# Patient Record
Sex: MALE | Race: WHITE | NOT HISPANIC OR LATINO | URBAN - METROPOLITAN AREA
[De-identification: names, ages, dates, MRNs, and addresses within clinical notes are randomized per-mention and may not be internally consistent; named-entity substitution may affect disease eponyms.]

---

## 2024-04-02 ENCOUNTER — OFFICE VISIT (OUTPATIENT)
Dept: URGENT CARE | Facility: CLINIC | Age: 44
End: 2024-04-02
Payer: COMMERCIAL

## 2024-04-02 VITALS
OXYGEN SATURATION: 98 % | SYSTOLIC BLOOD PRESSURE: 144 MMHG | HEART RATE: 112 BPM | BODY MASS INDEX: 39.39 KG/M2 | RESPIRATION RATE: 18 BRPM | TEMPERATURE: 98 F | DIASTOLIC BLOOD PRESSURE: 90 MMHG | HEIGHT: 71 IN | WEIGHT: 281.38 LBS

## 2024-04-02 DIAGNOSIS — R10.30 LOWER ABDOMINAL PAIN: ICD-10-CM

## 2024-04-02 DIAGNOSIS — R10.9 ABDOMINAL CRAMPING: Primary | ICD-10-CM

## 2024-04-02 DIAGNOSIS — K59.00 CONSTIPATION, UNSPECIFIED CONSTIPATION TYPE: ICD-10-CM

## 2024-04-02 PROCEDURE — 99203 OFFICE O/P NEW LOW 30 MIN: CPT | Mod: S$GLB,,, | Performed by: STUDENT IN AN ORGANIZED HEALTH CARE EDUCATION/TRAINING PROGRAM

## 2024-04-02 NOTE — LETTER
April 2, 2024      [facility] [place]  [address]  [address]  [place] [address]  Phone: [phone]  Fax: [phone]       Patient: Reynaldo Jessica   YOB: 1980  Date of Visit: 04/02/2024    To Whom It May Concern:    Lynn Jessica  was at [facility] on 04/02/2024. The patient may return to work/school on 04/03/2024 with no restrictions. If you have any questions or concerns, or if I can be of further assistance, please do not hesitate to contact me.    Sincerely,    Papito Hrone NP

## 2024-04-02 NOTE — PROGRESS NOTES
"Subjective:      Patient ID: Reynaldo Jessica is a 44 y.o. male.    Vitals:  height is 5' 11" (1.803 m) and weight is 127.6 kg (281 lb 6.4 oz). His temperature is 97.7 °F (36.5 °C). His blood pressure is 144/90 (abnormal) and his pulse is 112 (abnormal). His respiration is 18 and oxygen saturation is 98%.     Chief Complaint: Abdominal Pain    Patient is a 44-year-old male who presents to clinic for evaluation of upset stomach.  Patient states symptoms began after eating a lot of bad food over the past Easter holiday.  Patient states had a lot of peanuts, pizza and other foods.  Patient states that he has taken over-the-counter laxatives which has helped ease symptoms.  Patient states that he has also been on clear liquids which he also believes has helped significantly.  Patient states that he is 80% better today.  Patient states that he figured he would stay at home and take it easy for another day.  Patient states that he primarily experiences some lower abdominal soreness at this point.  Patient states it typically hurts whenever he gets up in the morning then resolves throughout the day.  Patient states that pain at current is maybe rated a 1 on a 10 scale but as worst was a 5 on a 10 scale.  Patient states that he initially had some abdominal cramping before he took laxatives however that has now resolved.  Patient states he thought he might have had a little constipation yesterday morning which caused him to take the laxatives.  Patient states has been having normal bowel movements since that time.  Patient states that he has not experienced any nausea or vomiting, diarrhea, rectal bleeding.  Patient denies any abdominal trauma.  Patient denies any urinary symptoms.  Patient states he has not experienced any fever or chills.  Patient states that he just wants to make sure he is on the right track and doing much right at home and possibly get a work note.    Abdominal Pain  This is a new problem. The current " episode started in the past 7 days. The onset quality is sudden. The problem occurs intermittently. The problem has been waxing and waning. The abdominal pain does not radiate. Associated symptoms include constipation (Reports thought had small amount of constipation yesterday and took laxatives). Pertinent negatives include no diarrhea, fever, headaches, myalgias, nausea or vomiting. The pain is relieved by Activity. Treatments tried: laxative.       Constitution: Negative. Negative for chills, sweating, fatigue and fever.   HENT: Negative.  Negative for ear pain, congestion and sore throat.    Neck: neck negative.   Cardiovascular: Negative.  Negative for chest pain and palpitations.   Eyes: Negative.    Respiratory: Negative.  Negative for chest tightness, cough and shortness of breath.    Gastrointestinal:  Positive for abdominal pain (Reports some abdominal cramping and lower abdominal soreness) and constipation (Reports thought had small amount of constipation yesterday and took laxatives). Negative for abdominal trauma, nausea, vomiting, diarrhea, rectal bleeding and rectal pain.   Endocrine: negative.   Genitourinary: Negative.    Musculoskeletal: Negative.  Negative for muscle ache.   Skin: Negative.  Negative for color change, pale, rash and erythema.   Allergic/Immunologic: Negative.    Neurological: Negative.  Negative for dizziness, light-headedness, passing out, headaches, disorientation and altered mental status.   Hematologic/Lymphatic: Negative.    Psychiatric/Behavioral: Negative.  Negative for altered mental status, disorientation and confusion.       Objective:     Physical Exam   Constitutional: He is oriented to person, place, and time. He appears well-developed. He is cooperative.  Non-toxic appearance. He does not appear ill. No distress. obesity  HENT:   Head: Normocephalic and atraumatic.   Ears:   Right Ear: Hearing, tympanic membrane, external ear and ear canal normal.   Left Ear:  Hearing, tympanic membrane, external ear and ear canal normal.   Nose: Nose normal. No mucosal edema or nasal deformity. No epistaxis. Right sinus exhibits no maxillary sinus tenderness and no frontal sinus tenderness. Left sinus exhibits no maxillary sinus tenderness and no frontal sinus tenderness.   Mouth/Throat: Uvula is midline, oropharynx is clear and moist and mucous membranes are normal. Mucous membranes are moist. No trismus in the jaw. Normal dentition. No uvula swelling. Oropharynx is clear.   Eyes: Conjunctivae and lids are normal. Pupils are equal, round, and reactive to light. Right eye exhibits no discharge. Left eye exhibits no discharge. No scleral icterus.   Neck: Trachea normal and phonation normal. Neck supple. No neck rigidity present.   Cardiovascular: Regular rhythm, normal heart sounds and normal pulses. Tachycardia present.   Pulmonary/Chest: Effort normal and breath sounds normal. No respiratory distress. He has no wheezes. He has no rhonchi. He has no rales.   Abdominal: Normal appearance and bowel sounds are normal. He exhibits no distension. Soft. There is no abdominal tenderness. There is no rebound, no guarding, no left CVA tenderness and no right CVA tenderness.      Comments: Unable to reproduce any tenderness to light or deep palpation of the abdomen   Musculoskeletal: Normal range of motion.         General: Normal range of motion.      Cervical back: He exhibits no tenderness.   Lymphadenopathy:     He has no cervical adenopathy.   Neurological: He is alert and oriented to person, place, and time. He exhibits normal muscle tone.   Skin: Skin is warm, dry, intact, not diaphoretic, not pale and no rash. Capillary refill takes less than 2 seconds. No erythema   Psychiatric: His speech is normal and behavior is normal. Judgment and thought content normal.   Nursing note and vitals reviewed.      Assessment:     1. Abdominal cramping    2. Lower abdominal pain    3. Constipation,  unspecified constipation type        Plan:       Abdominal cramping    Lower abdominal pain    Constipation, unspecified constipation type                Differential diagnosis to include but not limited to colitis, diverticulitis, gastroenteritis, nephrolithiasis, constipation, UTI, appendicitis all discussed with patient.  Recommended patient have at least urinalysis and KUB while in clinic however refused.  Patient offered outpatient labs to include but not limited to CBC, CMP, lipase, and outpatient imaging for CT abdomen pelvis however he refused all.  Patient states he just wants to try clear liquids in the bland diet and then follow-up later if needed.  Patient states he just believes he needs an additional day of rest.  Patient refused need for any prescription medications.  Follow-up with PCP in 1 day.    Return to clinic as needed.    To ED for any continued, new, or acutely worsening symptoms.    Patient in agreement with plan of care.    DISCLAIMER: Please note that my documentation in this Electronic Healthcare Record was produced using speech recognition software and therefore may contain errors related to that software system.These could include grammar, punctuation and spelling errors or the inclusion/exclusion of phrases that were not intended. Garbled syntax, mangled pronouns, and other bizarre constructions may be attributed to that software system.